# Patient Record
Sex: FEMALE | Race: ASIAN | NOT HISPANIC OR LATINO | URBAN - METROPOLITAN AREA
[De-identification: names, ages, dates, MRNs, and addresses within clinical notes are randomized per-mention and may not be internally consistent; named-entity substitution may affect disease eponyms.]

---

## 2021-11-24 ENCOUNTER — OFFICE VISIT (OUTPATIENT)
Dept: URBAN - METROPOLITAN AREA CLINIC 51 | Facility: CLINIC | Age: 76
End: 2021-11-24
Payer: MEDICARE

## 2021-11-24 DIAGNOSIS — H52.4 PRESBYOPIA: ICD-10-CM

## 2021-11-24 DIAGNOSIS — H26.492 OTHER SECONDARY CATARACT, LEFT EYE: ICD-10-CM

## 2021-11-24 DIAGNOSIS — H40.033 ANATOMICAL NARROW ANGLE, BILATERAL: ICD-10-CM

## 2021-11-24 DIAGNOSIS — H10.223 KERATOCONJUNCTIVITIS SICCA, BILATERAL: Primary | ICD-10-CM

## 2021-11-24 PROCEDURE — 92133 CPTRZD OPH DX IMG PST SGM ON: CPT | Performed by: OPTOMETRIST

## 2021-11-24 PROCEDURE — 92004 COMPRE OPH EXAM NEW PT 1/>: CPT | Performed by: OPTOMETRIST

## 2021-11-24 ASSESSMENT — KERATOMETRY
OS: 42.13
OD: 42.60

## 2021-11-24 ASSESSMENT — INTRAOCULAR PRESSURE
OD: 13
OS: 13

## 2021-11-24 ASSESSMENT — VISUAL ACUITY
OD: 20/25
OS: 20/25

## 2021-11-24 NOTE — IMPRESSION/PLAN
Impression: Keratoconjunctivitis sicca, bilateral Plan: Continue ATs QID add mario qhs 
accounts for disomfort and tearing

## 2024-07-17 ENCOUNTER — APPOINTMENT (RX ONLY)
Dept: URBAN - METROPOLITAN AREA CLINIC 158 | Facility: CLINIC | Age: 79
Setting detail: DERMATOLOGY
End: 2024-07-17

## 2024-07-17 DIAGNOSIS — L80 VITILIGO: ICD-10-CM

## 2024-07-17 PROCEDURE — 99203 OFFICE O/P NEW LOW 30 MIN: CPT

## 2024-07-17 PROCEDURE — ? COUNSELING

## 2024-07-17 PROCEDURE — ? EDUCATIONAL RESOURCES PROVIDED

## 2024-07-17 PROCEDURE — ? PATIENT SPECIFIC COUNSELING

## 2024-07-17 PROCEDURE — ? DEFER

## 2024-07-17 ASSESSMENT — LOCATION DETAILED DESCRIPTION DERM
LOCATION DETAILED: RIGHT DORSAL MIDDLE METACARPOPHALANGEAL JOINT
LOCATION DETAILED: LEFT DORSAL MIDDLE METACARPOPHALANGEAL JOINT
LOCATION DETAILED: LEFT MID DORSAL MIDDLE FINGER
LOCATION DETAILED: RIGHT MID DORSAL MIDDLE FINGER
LOCATION DETAILED: 3RD WEB SPACE RIGHT HAND
LOCATION DETAILED: LEFT CENTRAL FRONTAL SCALP
LOCATION DETAILED: RIGHT SUPERIOR FRONTAL SCALP
LOCATION DETAILED: RIGHT MEDIAL FRONTAL SCALP
LOCATION DETAILED: PHILTRUM
LOCATION DETAILED: LEFT UPPER CUTANEOUS LIP
LOCATION DETAILED: LEFT SUPERIOR FOREHEAD
LOCATION DETAILED: RIGHT CENTRAL FRONTAL SCALP

## 2024-07-17 ASSESSMENT — LOCATION SIMPLE DESCRIPTION DERM
LOCATION SIMPLE: LEFT HAND
LOCATION SIMPLE: LEFT MIDDLE FINGER
LOCATION SIMPLE: LEFT LIP
LOCATION SIMPLE: RIGHT HAND
LOCATION SIMPLE: RIGHT MIDDLE FINGER
LOCATION SIMPLE: UPPER LIP
LOCATION SIMPLE: SCALP
LOCATION SIMPLE: LEFT SCALP
LOCATION SIMPLE: LEFT FOREHEAD
LOCATION SIMPLE: RIGHT SCALP

## 2024-07-17 ASSESSMENT — LOCATION ZONE DERM
LOCATION ZONE: FINGER
LOCATION ZONE: FACE
LOCATION ZONE: LIP
LOCATION ZONE: HAND
LOCATION ZONE: SCALP

## 2024-07-17 NOTE — PROCEDURE: PATIENT SPECIFIC COUNSELING
The patient was accompanied by her grandson today.  She has a long-standing history of vitiligo that affected her face, scalp, around the mouth, hands, and feet.  She has been using Opzelura cream for 1 year per her PCP without improvement. I discussed treatment with RAC phototherapy.  She will need biweekly treatment for 20 treatments to assess efficacy.  Will task Katrin to start PA(if needed) and get her scheduled.  Return to the clinic to see me after the 20th treatment. 
Detail Level: Detailed

## 2024-07-17 NOTE — PROCEDURE: DEFER
Procedure To Be Performed At Next Visit: Laser: Excimer
Other Procedure: XTRAC Laser
Instructions (Optional): Contact saqib Little @ [phone] to schedule XTRAC , 20 sessions Vitiligo, face, scalp hands. Task sent to Katrin
X Size Of Lesion In Cm (Optional): 0
Introduction Text (Please End With A Colon): The following procedure was deferred:
Detail Level: Detailed

## 2025-01-22 ENCOUNTER — OFFICE VISIT (OUTPATIENT)
Dept: URBAN - METROPOLITAN AREA CLINIC 44 | Facility: CLINIC | Age: 80
End: 2025-01-22
Payer: MEDICARE

## 2025-01-22 DIAGNOSIS — H52.4 PRESBYOPIA: ICD-10-CM

## 2025-01-22 DIAGNOSIS — Z96.1 PRESENCE OF INTRAOCULAR LENS: ICD-10-CM

## 2025-01-22 DIAGNOSIS — H40.033 ANATOMICAL NARROW ANGLE, BILATERAL: ICD-10-CM

## 2025-01-22 DIAGNOSIS — H16.143 PUNCTATE KERATITIS, BILATERAL: ICD-10-CM

## 2025-01-22 DIAGNOSIS — H43.813 VITREOUS DEGENERATION, BILATERAL: ICD-10-CM

## 2025-01-22 DIAGNOSIS — H04.123 DRY EYE SYNDROME OF BILATERAL LACRIMAL GLANDS: Primary | ICD-10-CM

## 2025-01-22 PROCEDURE — 92004 COMPRE OPH EXAM NEW PT 1/>: CPT

## 2025-01-22 PROCEDURE — 92134 CPTRZ OPH DX IMG PST SGM RTA: CPT

## 2025-01-22 ASSESSMENT — VISUAL ACUITY
OD: 20/20
OS: 20/25

## 2025-01-22 ASSESSMENT — KERATOMETRY
OD: 42.50
OS: 41.75

## 2025-01-22 ASSESSMENT — INTRAOCULAR PRESSURE
OS: 12
OD: 11